# Patient Record
Sex: FEMALE | Race: ASIAN | Employment: OTHER | URBAN - METROPOLITAN AREA
[De-identification: names, ages, dates, MRNs, and addresses within clinical notes are randomized per-mention and may not be internally consistent; named-entity substitution may affect disease eponyms.]

---

## 2019-05-22 PROCEDURE — 87186 SC STD MICRODIL/AGAR DIL: CPT | Performed by: OBSTETRICS & GYNECOLOGY

## 2019-05-22 PROCEDURE — 87205 SMEAR GRAM STAIN: CPT | Performed by: OBSTETRICS & GYNECOLOGY

## 2019-05-22 PROCEDURE — 87076 CULTURE ANAEROBE IDENT EACH: CPT | Performed by: OBSTETRICS & GYNECOLOGY

## 2019-05-22 PROCEDURE — 87070 CULTURE OTHR SPECIMN AEROBIC: CPT | Performed by: OBSTETRICS & GYNECOLOGY

## 2019-05-22 PROCEDURE — 87147 CULTURE TYPE IMMUNOLOGIC: CPT | Performed by: OBSTETRICS & GYNECOLOGY

## 2019-05-22 PROCEDURE — 87075 CULTR BACTERIA EXCEPT BLOOD: CPT | Performed by: OBSTETRICS & GYNECOLOGY

## 2019-06-26 PROCEDURE — 87070 CULTURE OTHR SPECIMN AEROBIC: CPT | Performed by: OBSTETRICS & GYNECOLOGY

## 2019-06-26 PROCEDURE — 87075 CULTR BACTERIA EXCEPT BLOOD: CPT | Performed by: OBSTETRICS & GYNECOLOGY

## 2019-06-26 PROCEDURE — 87205 SMEAR GRAM STAIN: CPT | Performed by: OBSTETRICS & GYNECOLOGY

## 2019-06-26 PROCEDURE — 87076 CULTURE ANAEROBE IDENT EACH: CPT | Performed by: OBSTETRICS & GYNECOLOGY

## 2019-08-07 PROCEDURE — 87070 CULTURE OTHR SPECIMN AEROBIC: CPT | Performed by: OBSTETRICS & GYNECOLOGY

## 2019-08-07 PROCEDURE — 87205 SMEAR GRAM STAIN: CPT | Performed by: OBSTETRICS & GYNECOLOGY

## 2019-08-07 PROCEDURE — 87186 SC STD MICRODIL/AGAR DIL: CPT | Performed by: OBSTETRICS & GYNECOLOGY

## 2019-08-07 PROCEDURE — 87076 CULTURE ANAEROBE IDENT EACH: CPT | Performed by: OBSTETRICS & GYNECOLOGY

## 2019-08-07 PROCEDURE — 87075 CULTR BACTERIA EXCEPT BLOOD: CPT | Performed by: OBSTETRICS & GYNECOLOGY

## 2019-08-07 PROCEDURE — 87077 CULTURE AEROBIC IDENTIFY: CPT | Performed by: OBSTETRICS & GYNECOLOGY

## 2020-08-14 ENCOUNTER — HOSPITAL ENCOUNTER (OUTPATIENT)
Dept: MRI IMAGING | Age: 85
Discharge: HOME | End: 2020-08-14
Attending: FAMILY MEDICINE
Payer: MEDICARE

## 2020-08-14 DIAGNOSIS — R41.3 MEMORY LOSS: ICD-10-CM

## 2020-08-14 PROCEDURE — 70551 MRI BRAIN STEM W/O DYE: CPT | Performed by: FAMILY MEDICINE

## 2020-09-14 ENCOUNTER — OFFICE VISIT (OUTPATIENT)
Dept: NEUROLOGY | Facility: CLINIC | Age: 85
End: 2020-09-14
Payer: MEDICARE

## 2020-09-14 VITALS
SYSTOLIC BLOOD PRESSURE: 124 MMHG | HEART RATE: 74 BPM | BODY MASS INDEX: 24 KG/M2 | DIASTOLIC BLOOD PRESSURE: 70 MMHG | RESPIRATION RATE: 16 BRPM | WEIGHT: 127 LBS

## 2020-09-14 DIAGNOSIS — R41.3 MEMORY LOSS OR IMPAIRMENT: Primary | ICD-10-CM

## 2020-09-14 DIAGNOSIS — F02.80 LATE ONSET ALZHEIMER'S DISEASE WITHOUT BEHAVIORAL DISTURBANCE (HCC): ICD-10-CM

## 2020-09-14 DIAGNOSIS — F41.9 ANXIETY: ICD-10-CM

## 2020-09-14 DIAGNOSIS — G30.1 LATE ONSET ALZHEIMER'S DISEASE WITHOUT BEHAVIORAL DISTURBANCE (HCC): ICD-10-CM

## 2020-09-14 PROCEDURE — 99205 OFFICE O/P NEW HI 60 MIN: CPT | Performed by: OTHER

## 2020-09-14 RX ORDER — DONEPEZIL HYDROCHLORIDE 5 MG/1
5 TABLET, FILM COATED ORAL NIGHTLY
Qty: 30 TABLET | Refills: 11 | Status: SHIPPED | OUTPATIENT
Start: 2020-09-14 | End: 2021-02-02

## 2020-09-14 RX ORDER — ESOMEPRAZOLE MAGNESIUM 40 MG/1
40 CAPSULE, DELAYED RELEASE ORAL DAILY
COMMUNITY
Start: 2020-04-27

## 2020-09-14 RX ORDER — FENOFIBRATE 145 MG/1
145 TABLET, COATED ORAL
COMMUNITY
Start: 2020-07-07 | End: 2020-09-14

## 2020-09-14 RX ORDER — MECLIZINE HCL 12.5 MG/1
12.5 TABLET ORAL 3 TIMES DAILY PRN
COMMUNITY

## 2020-09-14 RX ORDER — ESCITALOPRAM OXALATE 5 MG/1
5 TABLET ORAL DAILY
Qty: 30 TABLET | Refills: 5 | Status: SHIPPED | OUTPATIENT
Start: 2020-09-14 | End: 2021-02-02

## 2020-09-14 RX ORDER — ACETAMINOPHEN 500 MG
500 TABLET ORAL AS NEEDED
COMMUNITY

## 2020-09-14 RX ORDER — NIACIN 500 MG
500 TABLET ORAL
COMMUNITY
End: 2021-02-02 | Stop reason: ALTCHOICE

## 2020-09-14 NOTE — PROGRESS NOTES
Patient was diagnosis with dementia. Patient is having difficulty with long and short term memory. Changes in speech. Denies facial numbness or tingling. Headaches are mostly on the left side of the head.

## 2020-09-14 NOTE — PROGRESS NOTES
[facility]   Neurology; INITIAL CLINIC VISIT  2020, 1:40 PM     Malena Weems Patient Status:  No patient class for patient encounter    1935 MRN [id number]   Location [address] [place] [address], [address] [place] [address], [address] [place] [address] daily. 30 tablet 5   • Donepezil HCl 5 MG Oral Tab Take 1 tablet (5 mg total) by mouth nightly. 30 tablet 11   • Clindamycin HCl 300 MG Oral Cap Take 1 capsule (300 mg total) by mouth 3 (three) times daily.  21 capsule 0   • DESOXIMETASONE 0.05 % External C cyanosis, radial and pedal pulse is normal.  Skin:  No unusual lesions    Neurologic Examination:  Mental status: he is awake, alert, oriented to time, name, her MMS is 15/30,   Language and speech: language is intact in expression and comprehension, no dy FOLIC ACID SERUM(FOLATE)          Impression/Plan:  (R41.3) Memory loss or impairment  (primary encounter diagnosis)  Plan: EEG, VITAMIN B12, ASSAY, THYROID STIM HORMONE,         FOLIC ACID SERUM(FOLATE)    (F41.9) Anxiety    (G30.1,  F02.80) Late onset Al

## 2020-09-24 ENCOUNTER — NURSE ONLY (OUTPATIENT)
Dept: ELECTROPHYSIOLOGY | Facility: HOSPITAL | Age: 85
End: 2020-09-24
Attending: Other
Payer: MEDICARE

## 2020-09-24 ENCOUNTER — LAB ENCOUNTER (OUTPATIENT)
Dept: LAB | Age: 85
End: 2020-09-24
Attending: DERMATOLOGY
Payer: MEDICARE

## 2020-09-24 DIAGNOSIS — R41.3 MEMORY LOSS OR IMPAIRMENT: ICD-10-CM

## 2020-09-24 LAB
FOLATE SERPL-MCNC: 52.2 NG/ML (ref 8.7–?)
TSI SER-ACNC: 0.34 MIU/ML (ref 0.36–3.74)
VIT B12 SERPL-MCNC: 1804 PG/ML (ref 193–986)

## 2020-09-24 PROCEDURE — 95819 EEG AWAKE AND ASLEEP: CPT | Performed by: OTHER

## 2020-09-24 PROCEDURE — 82607 VITAMIN B-12: CPT

## 2020-09-24 PROCEDURE — 82746 ASSAY OF FOLIC ACID SERUM: CPT

## 2020-09-24 PROCEDURE — 84443 ASSAY THYROID STIM HORMONE: CPT

## 2020-09-24 PROCEDURE — 36415 COLL VENOUS BLD VENIPUNCTURE: CPT

## 2020-09-29 ENCOUNTER — TELEPHONE (OUTPATIENT)
Dept: NEUROLOGY | Facility: CLINIC | Age: 85
End: 2020-09-29

## 2020-09-29 NOTE — TELEPHONE ENCOUNTER
[facility] message sent to patient with the below results.       ----- Message from Cherelle Franklin MD sent at 9/24/2020  4:06 PM CDT -----  His TSH is low, need to call primary to see if anything need to be done,    Hero Quintanilla MD   9/25/2020  2:11 PM      EEG mathew

## 2020-12-13 ENCOUNTER — HOSPITAL ENCOUNTER (EMERGENCY)
Facility: HOSPITAL | Age: 85
Discharge: HOME | End: 2020-12-13
Attending: EMERGENCY MEDICINE
Payer: MEDICARE

## 2020-12-13 VITALS
WEIGHT: 125 LBS | SYSTOLIC BLOOD PRESSURE: 154 MMHG | TEMPERATURE: 98 F | RESPIRATION RATE: 18 BRPM | HEIGHT: 61 IN | OXYGEN SATURATION: 99 % | DIASTOLIC BLOOD PRESSURE: 74 MMHG | BODY MASS INDEX: 23.6 KG/M2 | HEART RATE: 63 BPM

## 2020-12-13 DIAGNOSIS — N30.01 ACUTE CYSTITIS WITH HEMATURIA: Primary | ICD-10-CM

## 2020-12-13 PROCEDURE — 99283 EMERGENCY DEPT VISIT LOW MDM: CPT

## 2020-12-13 PROCEDURE — 87086 URINE CULTURE/COLONY COUNT: CPT | Performed by: EMERGENCY MEDICINE

## 2020-12-13 PROCEDURE — 81001 URINALYSIS AUTO W/SCOPE: CPT | Performed by: EMERGENCY MEDICINE

## 2020-12-13 RX ORDER — SULFAMETHOXAZOLE AND TRIMETHOPRIM 800; 160 MG/1; MG/1
1 TABLET ORAL 2 TIMES DAILY
Qty: 14 TABLET | Refills: 0 | Status: SHIPPED | OUTPATIENT
Start: 2020-12-13 | End: 2020-12-20

## 2020-12-14 NOTE — ED INITIAL ASSESSMENT (HPI)
Patient to ED for complaint of an episode of hematuria tonight while urinating. Patient denies any other episodes today. Denies pain. Denies difficulty urinating.

## 2020-12-14 NOTE — ED PROVIDER NOTES
Patient Seen in: [facility] Emergency Department      History   Patient presents with:  Urinary Symptoms    Stated Complaint: hematuria    HPI    28-year-old female who has a history of dementia comes to the hospital complaint of having difficulty w cyanosis or edema noted.   Full range of motion noted without tenderness  Neuro: No focal deficits noted    ED Course     Labs Reviewed   URINALYSIS WITH CULTURE REFLEX - Abnormal; Notable for the following components:       Result Value    Clarity Urine Cl

## 2021-02-02 ENCOUNTER — OFFICE VISIT (OUTPATIENT)
Dept: NEUROLOGY | Facility: CLINIC | Age: 86
End: 2021-02-02
Payer: MEDICARE

## 2021-02-02 VITALS
SYSTOLIC BLOOD PRESSURE: 127 MMHG | WEIGHT: 125 LBS | RESPIRATION RATE: 16 BRPM | DIASTOLIC BLOOD PRESSURE: 55 MMHG | HEART RATE: 64 BPM | BODY MASS INDEX: 24 KG/M2

## 2021-02-02 DIAGNOSIS — F02.80 LATE ONSET ALZHEIMER'S DISEASE WITHOUT BEHAVIORAL DISTURBANCE (HCC): ICD-10-CM

## 2021-02-02 DIAGNOSIS — G30.1 LATE ONSET ALZHEIMER'S DISEASE WITHOUT BEHAVIORAL DISTURBANCE (HCC): ICD-10-CM

## 2021-02-02 DIAGNOSIS — R41.3 MEMORY LOSS OR IMPAIRMENT: Primary | ICD-10-CM

## 2021-02-02 DIAGNOSIS — F41.9 ANXIETY: ICD-10-CM

## 2021-02-02 PROCEDURE — 99215 OFFICE O/P EST HI 40 MIN: CPT | Performed by: OTHER

## 2021-02-02 RX ORDER — DONEPEZIL HYDROCHLORIDE 10 MG/1
10 TABLET, FILM COATED ORAL NIGHTLY
Qty: 90 TABLET | Refills: 3 | Status: SHIPPED | OUTPATIENT
Start: 2021-02-02

## 2021-02-02 RX ORDER — ESCITALOPRAM OXALATE 5 MG/1
5 TABLET ORAL DAILY
Qty: 30 TABLET | Refills: 5 | Status: SHIPPED | OUTPATIENT
Start: 2021-02-02 | End: 2021-07-21

## 2021-02-02 NOTE — PROGRESS NOTES
[facility]   Neurology; INITIAL CLINIC VISIT  2020, 1:40 PM     Lizette Ozuna Patient Status:  No patient class for patient encounter    1935 MRN [id number]   Location ED [facility], [address], [address] [facility] PCP HCl 5 MG Oral Tab Take 1 tablet (5 mg total) by mouth nightly. 30 tablet 11   • DESOXIMETASONE 0.05 % External Cream APPLY 1 GRAM TOPICALLY TWICE DAILY FOR 7 DAYS 60 g 0   • Ibandronate Sodium 150 MG Oral Tab Take 1 tablet by mouth every 30 (thirty) days. in expression and comprehension, no dysarthria, voice is normal in volume, follow commends well;  Memory and comprehension is poor.    Cranial Nerves       CN II:  Visual fields full, Pupils equal and reactive, Fundi normal       CN III, IV, VI:  Horrizonta for adjustment. lexapro 5 mg qd for anxiety,     No follow-ups on file. We discussed in depth regarding diagnosis, prognosis, treatment. Side effect of medications were discussed in details.  The patient and family were given ample opportunity to ask

## 2021-05-19 ENCOUNTER — PATIENT MESSAGE (OUTPATIENT)
Dept: NEUROLOGY | Facility: CLINIC | Age: 86
End: 2021-05-19

## 2021-05-19 NOTE — TELEPHONE ENCOUNTER
From: Kaitlyn Underwood  To: Roxi Billy MD  Sent: 5/19/2021 10:48 AM CDT  Subject: Other    I am taking escitalopram 5mg one tablet every day AM. How long I have to take it? Can I stop it for some time and start again if needed?   Thanks

## 2021-06-07 ENCOUNTER — PATIENT MESSAGE (OUTPATIENT)
Dept: NEUROLOGY | Facility: CLINIC | Age: 86
End: 2021-06-07

## 2021-06-07 NOTE — TELEPHONE ENCOUNTER
From: Hugo Lopez  To: Gabi Dooley MD  Sent: 6/7/2021 10:41 AM CDT  Subject: Other    A new drug brand name Wendi Hiss has been approved for Alzheimer treatment recently by FDA. Do you think it can be useful for my treatment?  Thanks,  nishi flynn

## 2021-06-13 ENCOUNTER — PATIENT MESSAGE (OUTPATIENT)
Dept: NEUROLOGY | Facility: CLINIC | Age: 86
End: 2021-06-13

## 2021-06-14 NOTE — TELEPHONE ENCOUNTER
From: Caterina Conley  To: Fransisco Jarrett MD  Sent: 6/13/2021 2:59 PM CDT  Subject: Other    Dr. Renetta Jackson,  Thanks for your reply to latest FDA approved medicine for Alzheimer. It is not suitable for me.  Do you think that prescription medicines (ritalin or adderall

## 2021-07-12 ENCOUNTER — PATIENT MESSAGE (OUTPATIENT)
Dept: NEUROLOGY | Facility: CLINIC | Age: 86
End: 2021-07-12

## 2021-07-13 NOTE — TELEPHONE ENCOUNTER
From: Chidi Staff  To: Annie Anders MD  Sent: 7/12/2021 9:57 PM CDT  Subject: Non-Urgent Medical Question    Hi,  For the past week, i am sleeping in the night very well.  This is something new for me as I was not sleeping at all in the night for the last few

## 2021-07-21 NOTE — TELEPHONE ENCOUNTER
Medication: ESCITALOPRAM 5 MG     Date of last refill: 2/2/21 (#30/5)  [place]    Last office visit: 2/2/21   Due back to clinic per last office note:  6 months  Date next office visit scheduled:  No future appointmen